# Patient Record
Sex: MALE | Race: WHITE | NOT HISPANIC OR LATINO | Employment: OTHER | URBAN - METROPOLITAN AREA
[De-identification: names, ages, dates, MRNs, and addresses within clinical notes are randomized per-mention and may not be internally consistent; named-entity substitution may affect disease eponyms.]

---

## 2018-01-02 ENCOUNTER — TELEPHONE (OUTPATIENT)
Dept: NEUROLOGY | Facility: CLINIC | Age: 53
End: 2018-01-02

## 2018-01-02 NOTE — TELEPHONE ENCOUNTER
----- Message from Maninder Gaona sent at 1/2/2018 10:48 AM CST -----  Contact: Kristi (Wife) [phone]  Kristi called to speak to someone regarding scheduling the patient's appointment. He needs to be seen for movement. Please contact her to discuss further.

## 2018-04-04 ENCOUNTER — OFFICE VISIT (OUTPATIENT)
Dept: NEUROLOGY | Facility: CLINIC | Age: 53
End: 2018-04-04
Payer: MEDICARE

## 2018-04-04 VITALS
WEIGHT: 201.5 LBS | SYSTOLIC BLOOD PRESSURE: 122 MMHG | BODY MASS INDEX: 30.54 KG/M2 | HEART RATE: 73 BPM | HEIGHT: 68 IN | DIASTOLIC BLOOD PRESSURE: 79 MMHG

## 2018-04-04 DIAGNOSIS — G31.84 MILD COGNITIVE IMPAIRMENT: ICD-10-CM

## 2018-04-04 DIAGNOSIS — G20.C PARKINSONISM, UNSPECIFIED PARKINSONISM TYPE: Primary | ICD-10-CM

## 2018-04-04 DIAGNOSIS — R49.8 HYPOPHONIA: ICD-10-CM

## 2018-04-04 DIAGNOSIS — M62.838 MUSCLE SPASM: ICD-10-CM

## 2018-04-04 PROCEDURE — 99204 OFFICE O/P NEW MOD 45 MIN: CPT | Mod: S$PBB,,, | Performed by: PSYCHIATRY & NEUROLOGY

## 2018-04-04 PROCEDURE — 99213 OFFICE O/P EST LOW 20 MIN: CPT | Mod: PBBFAC | Performed by: PSYCHIATRY & NEUROLOGY

## 2018-04-04 PROCEDURE — 99999 PR PBB SHADOW E&M-EST. PATIENT-LVL III: CPT | Mod: PBBFAC,,, | Performed by: PSYCHIATRY & NEUROLOGY

## 2018-04-04 RX ORDER — ONDANSETRON HYDROCHLORIDE 8 MG/1
8 TABLET, FILM COATED ORAL
COMMUNITY

## 2018-04-04 RX ORDER — DONEPEZIL HYDROCHLORIDE 10 MG/1
10 TABLET, FILM COATED ORAL 2 TIMES DAILY
COMMUNITY
End: 2019-11-18 | Stop reason: SDUPTHER

## 2018-04-04 RX ORDER — METAXALONE 800 MG/1
800 TABLET ORAL 3 TIMES DAILY
COMMUNITY
End: 2018-04-04

## 2018-04-04 RX ORDER — METAXALONE 800 MG/1
800 TABLET ORAL 3 TIMES DAILY PRN
Qty: 90 TABLET | Refills: 4 | Status: SHIPPED | OUTPATIENT
Start: 2018-04-04

## 2018-04-04 RX ORDER — MEMANTINE HYDROCHLORIDE 10 MG/1
10 TABLET ORAL 2 TIMES DAILY
COMMUNITY
End: 2019-11-18 | Stop reason: SDUPTHER

## 2018-04-04 RX ORDER — LOSARTAN POTASSIUM 25 MG/1
25 TABLET ORAL
COMMUNITY
End: 2019-11-18

## 2018-04-04 RX ORDER — AMOXICILLIN 500 MG
CAPSULE ORAL DAILY
COMMUNITY

## 2018-04-04 RX ORDER — CARBIDOPA AND LEVODOPA 25; 100 MG/1; MG/1
1 TABLET ORAL 3 TIMES DAILY
COMMUNITY
End: 2018-05-25 | Stop reason: SDUPTHER

## 2018-04-04 RX ORDER — RASAGILINE 0.5 MG/1
1 TABLET ORAL DAILY
COMMUNITY
End: 2018-04-04

## 2018-04-04 RX ORDER — TIZANIDINE 4 MG/1
4 TABLET ORAL EVERY 6 HOURS PRN
COMMUNITY
End: 2018-04-04

## 2018-04-04 RX ORDER — LORAZEPAM 1 MG/1
1 TABLET ORAL EVERY 6 HOURS PRN
COMMUNITY

## 2018-04-04 NOTE — LETTER
April 6, 2018      Jose Lewis MD  [address] [place] [address] [place] [address]           [facility]  [address]  [place] [address]  Phone: [phone]  Fax: [phone]          Patient: Ash Olson   MR Number: [id number]   YOB: 1965   Date of Visit: 4/4/2018       Dear Dr. Jose Lewis:    Thank you for referring Ash Olson to me for evaluation. Attached you will find relevant portions of my assessment and plan of care.    If you have questions, please do not hesitate to call me. I look forward to following Ash Olson along with you.    Sincerely,    Anastasia Swenson MD    Enclosure  CC:  No Recipients    If you would like to receive this communication electronically, please contact [email or web address] or [phone] to request more information on [employer] Link access.    For providers and/or their staff who would like to refer a patient to [facility], please contact us through our one-stop-shop provider referral line, [facility] , at [phone].    If you feel you have received this communication in error or would no longer like to receive these types of communications, please e-mail [email or web address]

## 2018-04-04 NOTE — PROGRESS NOTES
"Ash Olson I. Chief Complaints during this visit:  New Patient visit for  LBD     Jose Lewis MD  [address] [facility] [place] [address]    Primary Care Physician  Jose Lewsi MD  [address] [facility] [place] [address]      History of present illness:   52 y.o.  male seen in consultation at the request of  Dr. Lewis for evaluation of LBD.  Accompanied by wife.  Gets sore, stiff and slow.  Difficult to get up.  Very soft-spoken and wife finds this difficult.  Also seems to not understand wife at times.  She says his memory is fine, but his "processor" is bad.  Cannot multitask at all.  Very dizzy and light-headed in the mornings.    Diagnosed in 2013 with 7 different things in 9 days by Dr. Melendrez (FTD, PD, etc), so when told LBD, neither were ready to believe.  Wife knew Dr. Miroslava Campos and so got her him in to see her.  Dr. Campos thought it was MCI, not LBD.  Got him seen at [place], where LBD was felt more likely, though he has never had hallucinations.  He did not bring the neuropsych testing with him.    Was working as a , full time at the time of diagnosis.  Lost job 2014.  Was noticing tremor in hands when reaching for coffee cups.  Then started noticing difficulty using computer (not able to open programs to work on them).    He thinks the levodopa helps with his tremors.  Loses balance, falls forward.  He thinks it the glasses.    See or senses someone near him, but does not talk to them.  He had this a year or so in.  Was active dreamer around time of diagnosis, but no longer.    Endorses mild frustrations, but denies sadness.  Denies apathy (fishing, wood working).      II.  Review of systems:  As in HPI,  otherwise, balance 10 systems reviewed and are negative.    III.  Past Medical History:   Diagnosis Date    Mild cognitive impairment 4/4/2018    Parkinsonism 4/4/2018 2013 Resting tremor R>L     No family history " "on file.  Social History     Social History    Marital status:      Spouse name: N/A    Number of children: N/A    Years of education: N/A     Social History Main Topics    Smoking status: Never Smoker    Smokeless tobacco: Never Used    Alcohol use 0.6 oz/week     1 Cans of beer per week    Drug use: Unknown    Sexual activity: Not Asked     Other Topics Concern    None     Social History Narrative    None     Meds:  Skelaxin, zanaflex 4mg (rare, only for back pain)  azilect 0.5mg qd ($300/month)  namenda 10 bid  aricept 10 bid      PRIOR problem-specific medications tried:  Rivastigmine (looked like walking robot).    Review of patient's allergies indicates:  No Known Allergies    IV. Physical Exam    Vitals:    04/04/18 1010   BP: 122/79   Pulse: 73   Weight: 91.4 kg (201 lb 8 oz)   Height: 5' 8" (1.727 m)     General appearance: Well nourished, well developed, no acute distress.         Cardiovascular:  pedal pulses 2, no edema or cyanosis, heart regular rate and rhythym, no carotid bruits.         -------------------------------------------------------------  Facial Expression: 2: Mild: In addition to decreased eye-blink frequency, Masked facies present in the lower  face as well, namely fewer movements around the mouth, such as less  spontaneous smiling, but lips not parted.      Affect: full       Orientation to time & place:  Oriented to time, place, person and situation       Attention & concentration:  Normal attention span and concentration       Memory:  Recent and remote memory intact  Language: Spontaneous, fluent; able to repeat and name objects        Fund of knowledge:  Aware of current events        Speech:  2: Mild: Loss of modulation, diction, or volume, with a few words unclear, but the overall  sentences easy to follow   -------------------------------------------------------  Cranial nerves: normal visual acuity, visual fields full, optic discs not visualized, pupils equal " round and reactive, extraocular movements intact,       facial sensation intact, face symmetrical, hearing intact to whisper, palate raises midline, shoulder shrug strength normal, tongue protrudes midline.        -------------------------------------------------------  Musculoskeletal  Muscle tone: cogwheel bue       Muscle Bulk: all 4 extremities normal        Muscle strength:  5/5 in all 4 extremities        No pronator drift  Sensation: Intact to light touch in all extremities        Deep tendon Reflexes: 2 bilateral biceps, triceps, patella and ankles        --------------------------------------------------------------  Cerebellar and Coordination  Gait:  1: Slight: Independent walking with minor gait impairment.         Finger-nose: no dysmetria       Rapid Alternating Movements (pronation/supination):  Slowed bilaterally, worse on left  --------------------------------------------------------------  MOVEMENT DISORDERS FOCUSED EXAM  Abnormality of movement (bradykinesia, hyperkinesia) present? Yes, 2: Mild: Mild global slowness and poverty of spontaneous movements.    Tremor present?   Yes, intermittent of either hand   Posture:  1: Slight: Not quite erect, but posture could be normal for older person.   Postural stability:  no Rhomberg    V.  Laboratory/ Radiological Data:          3/4/13 MMSE is 29/30 ([facility])    PET 3/28/13:  IMPRESSION:  Based upon visual interpretation and quantitative analysis, abnormal  FDG PET.  Hypometabolism most prominently noted within bilateral  temporoparietal cortex with involvement of the inferoparietal regions  as well as the posterior cingulate.  Also of note, hypometabolic  change within the visual association cortex.  Relative sparring of  metabolism within the frontal regions as well as the anterior temporal  lobe.  Pattern of hypometabolism consistent with underlying  neurodegenerative disorder which may be consistent with underlying  Alzheimer's, however, the  involvement of the visual association cortex  extends the differential diagnosis to include diffuse Lewy body disease.  Review of prior nuclear imaging includes an abnormal brain DaTSCAN with  relatively symmetrically impaired radiotracer uptake within bilateral  striatum.  Abnormal DaTSCAN images in conjunction with the above described  FDG findings are most suggestive of diffuse Lewy body disease. Clinical  correlation required.       VI. Assessment and Plan            Problem List Items Addressed This Visit        1 - High    Parkinsonism - Primary    Overview     2013 Resting tremor R>L         Current Assessment & Plan     Physically classic PD, but with significant changes in cognitive flexibility and speed.  Thus, I see where others may have thought LBD and this still may be most accurate diagnosis, but, as I explained to wife and patient, there is no specific treatment for any of the types of parkinsonism.  Everything is symptom management.  Thus, we need to know, in real life, how each medication effects him (benefits, risks).   -> see instructions for levodopa trial   -> need outside neuropsych testing results for next visit            2     Mild cognitive impairment    Overview     Predominantly speed and multitasking abilities down.            3     Hypophonia       4     Muscle spasm    Current Assessment & Plan     Has taken muscle relaxants prn for back for a long time.   -> refilled skelaxin, d/c tizanidine   -> d/c azilect (risks of serotonin syndrome in combo with muscle relaxants)         Relevant Medications    metaxalone (SKELAXIN) 800 MG tablet                Follow-up in about 3 months (around 7/4/2018).             ___________________________________________________________    I appreciate the opportunity to participate in the care of this patient and will communicate my assessment and plan back to the referring physician via copy of this note.

## 2018-04-04 NOTE — PATIENT INSTRUCTIONS
Test sinemet (carbidopa-levodopa) to see if it works on your tremor as well as your balance, stiffness and movements.    Take one pill about 4 hours apart, 3 times a day.      Example:  Take one at 8am, 12pm and 4pm.  Do this for 3-4 days.  If you get nauseated or the visions of people worsens please stop taking it.    THEN, take 2 pills at 8am, 12pm and 4pm.  Do this for 3-4 days, then you can go back to just one at a time.  It will tell us.    If you feel it HELPS your tremor or your walking, please let me know by [employer] message.  If you cannot tell any difference, then let me know (and stop taking it).

## 2018-04-06 PROBLEM — M62.838 MUSCLE SPASM: Status: ACTIVE | Noted: 2018-04-06

## 2018-04-06 NOTE — ASSESSMENT & PLAN NOTE
ANTICOAGULATION FOLLOW-UP CLINIC VISIT    Patient Name:  Tucker Slater  Date:  2/13/2017  Contact Type:  Face to Face    SUBJECTIVE:     Patient Findings     Positives Inflammation (Pt reports increased right hip pain after carrying a bag of salt to spread on driveway.)           OBJECTIVE    INR Protime   Date Value Ref Range Status   02/13/2017 3.5 (A) 0.86 - 1.14 Final       ASSESSMENT / PLAN  INR assessment SUPRA    Recheck INR In: 3 WEEKS    INR Location Clinic      Anticoagulation Summary as of 2/13/2017     INR goal 2.0-3.0   Today's INR 3.5!   Maintenance plan 2.5 mg (2.5 mg x 1) every day   Full instructions 2/13: 1.25 mg; Otherwise 2.5 mg every day   Weekly total 17.5 mg   Plan last modified Gail Valdovinos RN (4/7/2016)   Next INR check 3/6/2017   Priority INR   Target end date     Indications   Long-term (current) use of anticoagulants [Z79.01] [Z79.01]  Atrial fibrillation (H) [I48.91]         Anticoagulation Episode Summary     INR check location     Preferred lab     Send INR reminders to [facility]    Comments       Anticoagulation Care Providers     Provider Role Specialty Phone number    Kwadwo Winslow MD Responsible Internal Medicine [phone]            See the Encounter Report to view Anticoagulation Flowsheet and Dosing Calendar (Go to Encounters tab in chart review, and find the Anticoagulation Therapy Visit)    Dosage adjustment made based on physician directed care plan.    Gail Valdovinos, RN                Has taken muscle relaxants prn for back for a long time.   -> refilled skelaxin, d/c tizanidine   -> d/c azilect (risks of serotonin syndrome in combo with muscle relaxants)

## 2018-04-06 NOTE — ASSESSMENT & PLAN NOTE
Physically classic PD, but with significant changes in cognitive flexibility and speed.  Thus, I see where others may have thought LBD and this still may be most accurate diagnosis, but, as I explained to wife and patient, there is no specific treatment for any of the types of parkinsonism.  Everything is symptom management.  Thus, we need to know, in real life, how each medication effects him (benefits, risks).   -> see instructions for levodopa trial   -> need outside neuropsych testing results for next visit

## 2018-05-23 ENCOUNTER — TELEPHONE (OUTPATIENT)
Dept: NEUROLOGY | Facility: CLINIC | Age: 53
End: 2018-05-23

## 2018-05-23 NOTE — TELEPHONE ENCOUNTER
----- Message from Donna Wayne sent at 5/23/2018  1:49 PM CDT -----  Contact: Kristi (wife) @ [phone]  Caller has a question in regards to the medication: carbidopa-levodopa  mg (SINEMET)  mg per tabl, asking if the dosage can be changed. Please call.

## 2018-05-23 NOTE — TELEPHONE ENCOUNTER
Called and spoke  regarding her 's medication (Carb-Levo). She stated that he have been taking two tablets every four hours as directed by , but the medication is making him drowsy, not moving very much or drinking very much. She stated that if  can change the way her takes it or take a pill away, plus they are going on vacation starting tomorrow and he wants to know if he can drink red wine and how long after he takes his medication can he have it

## 2018-05-25 RX ORDER — CARBIDOPA AND LEVODOPA 25; 100 MG/1; MG/1
2 TABLET ORAL 3 TIMES DAILY
Qty: 180 TABLET | Refills: 2 | Status: SHIPPED | OUTPATIENT
Start: 2018-05-25 | End: 2019-11-18 | Stop reason: SDUPTHER

## 2018-05-25 NOTE — TELEPHONE ENCOUNTER
Please tell them to read instructions on AVS.  If he is having side-effects, simply back back down to 1 pill at a time or even stop.

## 2018-05-25 NOTE — TELEPHONE ENCOUNTER
----- Message from Donna Wayne sent at 5/25/2018  8:36 AM CDT -----  Rx Refill/Request    Who Called:  Kristi (wife)  Refill or New Rx:  refill  RX Name and Strength: carbidopa-levodopa  mg (SINEMET)  mg per tablet  How is the patient currently taking it? (ex. 1XDay): Per caller 6 tab per day   Is this a 30 day or 90 day RX:  30 day  Preferred Pharmacy with phone number:     [id number] [address] [address]  Phone: [phone] Fax: [phone]    Pt. Contact #:[phone]  Additional Information:says that the patient is on vacation in [place] and is out of his medication, needs sent to a different [facility]. Please see preferred pharmacy.

## 2018-05-29 NOTE — TELEPHONE ENCOUNTER
Called and left a message for  regarding his medications. I stated to give us a call back regarding this matter

## 2018-07-09 ENCOUNTER — OFFICE VISIT (OUTPATIENT)
Dept: NEUROLOGY | Facility: CLINIC | Age: 53
End: 2018-07-09
Payer: MEDICARE

## 2018-07-09 VITALS
BODY MASS INDEX: 29.6 KG/M2 | WEIGHT: 195.31 LBS | HEIGHT: 68 IN | SYSTOLIC BLOOD PRESSURE: 121 MMHG | HEART RATE: 56 BPM | DIASTOLIC BLOOD PRESSURE: 78 MMHG

## 2018-07-09 DIAGNOSIS — G31.84 MILD COGNITIVE IMPAIRMENT: ICD-10-CM

## 2018-07-09 DIAGNOSIS — R09.89 LABILE BLOOD PRESSURE: ICD-10-CM

## 2018-07-09 DIAGNOSIS — G20.C PARKINSONISM, UNSPECIFIED PARKINSONISM TYPE: Primary | ICD-10-CM

## 2018-07-09 PROBLEM — R73.09 LABILE BLOOD GLUCOSE: Status: ACTIVE | Noted: 2018-07-09

## 2018-07-09 PROCEDURE — 99214 OFFICE O/P EST MOD 30 MIN: CPT | Mod: S$GLB,,, | Performed by: PSYCHIATRY & NEUROLOGY

## 2018-07-09 PROCEDURE — 99999 PR PBB SHADOW E&M-EST. PATIENT-LVL III: CPT | Mod: PBBFAC,,, | Performed by: PSYCHIATRY & NEUROLOGY

## 2018-07-09 PROCEDURE — 99213 OFFICE O/P EST LOW 20 MIN: CPT | Mod: PBBFAC | Performed by: PSYCHIATRY & NEUROLOGY

## 2018-07-09 RX ORDER — CARBIDOPA AND LEVODOPA 50; 200 MG/1; MG/1
TABLET, EXTENDED RELEASE ORAL
Qty: 270 TABLET | Refills: 3 | Status: SHIPPED | OUTPATIENT
Start: 2018-07-09 | End: 2019-11-18 | Stop reason: SINTOL

## 2018-07-09 NOTE — PATIENT INSTRUCTIONS
Please get a copy of the neuropsychological testing you've had before.    I want to try to transition from the short-acting to the controlled release form:    Week 1:  Take 25/100 at 7am, 10:30, 2pm and 5:30pm  Take 50/200 at 8:30pm    Week 2:  Take 25/100 at 7am and 5:30pm  Take 50/200 at 10:30pm and 8:30pm    Week 3:  Take 50/200 at 7am, 2pm and 8:30pm

## 2018-07-09 NOTE — LETTER
July 10, 2018      Jose Lewis MD  [address] [place] [address] [place] [address]           [facility]  [address]  [place] [address]  Phone: [phone]  Fax: [phone]          Patient: Ash Olson   MR Number: [id number]   YOB: 1965   Date of Visit: 7/9/2018       Dear Dr. Jose Lewis:    Thank you for referring Ash Olson to me for evaluation. Attached you will find relevant portions of my assessment and plan of care.    If you have questions, please do not hesitate to call me. I look forward to following Ash Olson along with you.    Sincerely,    Anastasia Swenson MD    Enclosure  CC:  No Recipients    If you would like to receive this communication electronically, please contact [email or web address] or [phone] to request more information on [employer] Link access.    For providers and/or their staff who would like to refer a patient to [facility], please contact us through our one-stop-shop provider referral line, [facility], at [phone].    If you feel you have received this communication in error or would no longer like to receive these types of communications, please e-mail [email or web address]

## 2018-07-09 NOTE — PROGRESS NOTES
"Ash Olson I. Chief Complaints during this visit:  f/u Patient visit for  parkinsonism   Jose Lewis MD  [address] [facility] [place] [address]    Primary Care Physician  Jose Lewis MD  [address] [facility] [place] [address]      History of present illness:   53 y.o.  m seen in f/u for parkinsonism.  Accompanied by wife.  The sinemet 2 pills at a time, was helpful initially reduce the tremor, then started causing sleepiness a few weeks in.   Now back to 1 pill every 3 hours and sees the tremors come out when wearing off.  Also gets anxious when it wears off.  A crumb of ativan helps (1/4 of a 1mg pill).    Trouble getting in /out of the bed.    No sensation of people next to him (he says it was his glasses frames).    Interval history 4/4/18:  ...consultation at the request of  Dr. Lewis for evaluation of LBD.  Accompanied by wife.  Gets sore, stiff and slow.  Difficult to get up.  Very soft-spoken and wife finds this difficult.  Also seems to not understand wife at times.  She says his memory is fine, but his "processor" is bad.  Cannot multitask at all.  Very dizzy and light-headed in the mornings.  Diagnosed in 2013 with 7 different things in 9 days by Dr. Melendrez (FTD, PD, etc), so when told LBD, neither were ready to believe.  Wife knew Dr. Miroslava Campos and so got her him in to see her.  Dr. Campos thought it was MCI, not LBD.  Got him seen at [place], where LBD was felt more likely, though he has never had hallucinations [not accurate, see below].  He did not bring the neuropsych testing with him.  Was working as a , full time at the time of diagnosis.  Lost job 2014.  Was noticing tremor in hands when reaching for coffee cups.  Then started noticing difficulty using computer (not able to open programs to work on them).  He thinks the levodopa helps with his tremors.  Loses balance, falls forward.  He thinks it the " "glasses.    See or senses someone near him, but does not talk to them.  He had this a year or so in.  Was active dreamer around time of diagnosis, but no longer.  Endorses mild frustrations, but denies sadness.  Denies apathy (fishing, wood working).      II.  Review of systems:  As in HPI,  otherwise, balance 3 systems reviewed and are negative.    III.  Past Medical History:   Diagnosis Date    Mild cognitive impairment 4/4/2018    Parkinsonism 4/4/2018 2013 Resting tremor R>L     No family history on file.  Social History     Social History    Marital status:      Spouse name: N/A    Number of children: N/A    Years of education: N/A     Social History Main Topics    Smoking status: Never Smoker    Smokeless tobacco: Never Used    Alcohol use 0.6 oz/week     1 Cans of beer per week    Drug use: Unknown    Sexual activity: Not Asked     Other Topics Concern    None     Social History Narrative    None     Meds:  sinemet 25/100 at 7/10:30/2/5:30/8:30/10  Skelaxin (rare, only for back pain)  namenda 10 bid  aricept 10 bid      PRIOR problem-specific medications tried:  Rivastigmine (looked like walking robot); 2 pills of sinemet caused sleepiness.  Azilect was $$    Review of patient's allergies indicates:  No Known Allergies    IV. Physical Exam    Vitals:    07/09/18 1148   BP: 121/78   Pulse: (!) 56   Weight: 88.6 kg (195 lb 5.2 oz)   Height: 5' 8" (1.727 m)     General appearance: Well nourished, well developed, no acute distress.            -------------------------------------------------------------  Facial Expression: 2: Mild: In addition to decreased eye-blink frequency, Masked facies present in the lower  face as well, namely fewer movements around the mouth, such as less  spontaneous smiling, but lips not parted.      Affect: full       Orientation to time & place:  Oriented to time, place, person and situation       Attention & concentration:  Normal attention span and concentration "       Memory:  Recent and remote memory intact  Language: Spontaneous, fluent; able to repeat and name objects        Fund of knowledge:  Aware of current events        Speech:  2: Mild: Loss of modulation, diction, or volume, with a few words unclear, but the overall  sentences easy to follow   -------------------------------------------------------  Cranial nerves: wears glasses, visual fields full, optic discs not visualized, pupils equal round and reactive, extraocular movements intact,       facial sensation intact, face symmetrical, hearing intact to whisper, palate raises midline, shoulder shrug strength normal, tongue protrudes midline.        -------------------------------------------------------  Musculoskeletal  Muscle tone: cogwheel bue       Muscle Bulk: all 4 extremities normal        Muscle strength:  5/5 in all 4 extremities        No pronator drift     --------------------------------------------------------------  Cerebellar and Coordination  Gait:  1: Slight: Independent walking with minor gait impairment.         Finger-nose: no dysmetria       Rapid Alternating Movements (pronation/supination):  Slowed bilaterally, worse on left  --------------------------------------------------------------  MOVEMENT DISORDERS FOCUSED EXAM  Abnormality of movement (bradykinesia, hyperkinesia) present? Yes, 2: Mild: Mild global slowness and poverty of spontaneous movements.    Tremor present?   Yes, intermittent of either hand   Posture:  1: Slight: Not quite erect, but posture could be normal for older person.   Postural stability:  no Shravan MARTINS  Laboratory/ Radiological Data:   No new         From my note 4/4/18:  3/4/13 MMSE is 29/30 ([facility])    PET 3/28/13:  IMPRESSION:  Based upon visual interpretation and quantitative analysis, abnormal  FDG PET.  Hypometabolism most prominently noted within bilateral  temporoparietal cortex with involvement of the inferoparietal regions  as well as the posterior  "cingulate.  Also of note, hypometabolic  change within the visual association cortex.  Relative sparring of  metabolism within the frontal regions as well as the anterior temporal  lobe.  Pattern of hypometabolism consistent with underlying  neurodegenerative disorder which may be consistent with underlying  Alzheimer's, however, the involvement of the visual association cortex  extends the differential diagnosis to include diffuse Lewy body disease.  Review of prior nuclear imaging includes an abnormal brain DaTSCAN with  relatively symmetrically impaired radiotracer uptake within bilateral  striatum.  Abnormal DaTSCAN images in conjunction with the above described  FDG findings are most suggestive of diffuse Lewy body disease. Clinical  correlation required.       VI. Assessment and Plan            Problem List Items Addressed This Visit        1 - High    Parkinsonism - Primary    Overview     2013 Resting tremor R>L         Current Assessment & Plan     Physically classic PD, but with significant changes in cognitive flexibility and speed.  Tremor responds to sinemet and has wearing off anxiety, tremor.  Thus, I see where others may have thought LBD and this still may be most accurate diagnosis, but has not had any hallucinations.  Regardless, everything is symptom management.    -> change to sinemet CR (see instructions)   -> educated, again, about illusions, hallucinations and to report    -> again, need outside neuropsych testing results for next visit         Relevant Medications    carbidopa-levodopa  mg (SINEMET CR)  mg TbSR       2     Mild cognitive impairment    Overview     Predominantly speed and multitasking abilities down.            3     Labile blood pressure    Current Assessment & Plan     Diagnosed with "dysautonomia" by PCP, but I'm not yet sure this is accurate diagnosis.  Certainly, he has not had syncopal or presyncopal event, just elevated BP per outside records (and not " particularly variable; systolic 100 when on losartan and 154 when not).   -> follow                     Follow-up in about 3 months (around 10/9/2018).

## 2018-07-10 PROBLEM — R09.89 LABILE BLOOD PRESSURE: Status: ACTIVE | Noted: 2018-07-09

## 2018-07-10 NOTE — ASSESSMENT & PLAN NOTE
Physically classic PD, but with significant changes in cognitive flexibility and speed.  Tremor responds to sinemet and has wearing off anxiety, tremor.  Thus, I see where others may have thought LBD and this still may be most accurate diagnosis, but has not had any hallucinations.  Regardless, everything is symptom management.    -> change to sinemet CR (see instructions)   -> educated, again, about illusions, hallucinations and to report    -> again, need outside neuropsych testing results for next visit

## 2018-07-10 NOTE — ASSESSMENT & PLAN NOTE
"Diagnosed with "dysautonomia" by PCP, but I'm not yet sure this is accurate diagnosis.  Certainly, he has not had syncopal or presyncopal event, just elevated BP per outside records (and not particularly variable; systolic 100 when on losartan and 154 when not).   -> follow  "

## 2018-08-03 ENCOUNTER — TELEPHONE (OUTPATIENT)
Dept: NEUROLOGY | Facility: CLINIC | Age: 53
End: 2018-08-03

## 2018-08-03 NOTE — TELEPHONE ENCOUNTER
----- Message from Donna Wayne sent at 8/1/2018  2:12 PM CDT -----  Contact: Kristi (wife) @ [phone]  Calling to speak with someone regarding the patient being in week 2 for the medication: carbidopa-levodopa  mg (SINEMET CR)  mg TbSR, says the patient is having a firey feeling in his head, says that the increase in the dosage could possibly be the cause. Please call.

## 2018-08-21 ENCOUNTER — PATIENT MESSAGE (OUTPATIENT)
Dept: NEUROLOGY | Facility: CLINIC | Age: 53
End: 2018-08-21

## 2018-10-10 ENCOUNTER — OFFICE VISIT (OUTPATIENT)
Dept: NEUROLOGY | Facility: CLINIC | Age: 53
End: 2018-10-10
Payer: MEDICARE

## 2018-10-10 VITALS
HEIGHT: 68 IN | HEART RATE: 58 BPM | WEIGHT: 198.19 LBS | SYSTOLIC BLOOD PRESSURE: 103 MMHG | DIASTOLIC BLOOD PRESSURE: 70 MMHG | BODY MASS INDEX: 30.04 KG/M2

## 2018-10-10 DIAGNOSIS — G31.84 MILD COGNITIVE IMPAIRMENT: ICD-10-CM

## 2018-10-10 DIAGNOSIS — G20.C PARKINSONISM, UNSPECIFIED PARKINSONISM TYPE: Primary | ICD-10-CM

## 2018-10-10 PROCEDURE — 99214 OFFICE O/P EST MOD 30 MIN: CPT | Mod: PBBFAC | Performed by: PSYCHIATRY & NEUROLOGY

## 2018-10-10 PROCEDURE — 99214 OFFICE O/P EST MOD 30 MIN: CPT | Mod: S$PBB,,, | Performed by: PSYCHIATRY & NEUROLOGY

## 2018-10-10 PROCEDURE — 99999 PR PBB SHADOW E&M-EST. PATIENT-LVL IV: CPT | Mod: PBBFAC,,, | Performed by: PSYCHIATRY & NEUROLOGY

## 2018-10-10 RX ORDER — SERTRALINE HYDROCHLORIDE 25 MG/1
12.5 TABLET, FILM COATED ORAL
COMMUNITY
Start: 2018-08-07 | End: 2019-08-07

## 2018-10-10 RX ORDER — AMANTADINE HYDROCHLORIDE 100 MG/1
100 TABLET ORAL EVERY MORNING
Qty: 30 TABLET | Refills: 11 | Status: SHIPPED | OUTPATIENT
Start: 2018-10-10 | End: 2019-08-26 | Stop reason: SDUPTHER

## 2018-10-10 NOTE — PROGRESS NOTES
"Ash Olson I. Chief Complaints during this visit:  f/u Patient visit for  parkinsonism   Jose Lewis MD  [address] [place] [address]    Primary Care Physician  Jose Lewis MD  [address] [facility] [place] [address]      History of present illness:   53 y.o.  m seen in f/u for parkinsonism.  Accompanied by wife.  Did not tolerate CR, so back to sinemet 6x/day.  CR worked well at first, then started to worsen.  Going back to 1 pill of sinemet 25/100 every three hours, but works better.    Wife says she thinks his cognition is better when sinemet effective.  Still, the biggest issue is any task that requires multiple steps (such as opening an lillian in phone).      Interval history 7/9/18:  The sinemet 2 pills at a time, was helpful initially reduce the tremor, then started causing sleepiness a few weeks in.   Now back to 1 pill every 3 hours and sees the tremors come out when wearing off.  Also gets anxious when it wears off.  A crumb of ativan helps (1/4 of a 1mg pill).  Trouble getting in /out of the bed.  No sensation of people next to him (he says it was his glasses frames).    Interval history 4/4/18:  ...consultation at the request of  Dr. Lewis for evaluation of LBD.  Accompanied by wife.  Gets sore, stiff and slow.  Difficult to get up.  Very soft-spoken and wife finds this difficult.  Also seems to not understand wife at times.  She says his memory is fine, but his "processor" is bad.  Cannot multitask at all.  Very dizzy and light-headed in the mornings.  Diagnosed in 2013 with 7 different things in 9 days by Dr. Melendrez (FTD, PD, etc), so when told LBD, neither were ready to believe.  Wife knew Dr. Miroslava Campos and so got her him in to see her.  Dr. Campos thought it was MCI, not LBD.  Got him seen at [place], where LBD was felt more likely, though he has never had hallucinations [not accurate, see below].  He did not bring the " neuropsych testing with him.  Was working as a , full time at the time of diagnosis.  Lost job 2014.  Was noticing tremor in hands when reaching for coffee cups.  Then started noticing difficulty using computer (not able to open programs to work on them).  He thinks the levodopa helps with his tremors.  Loses balance, falls forward.  He thinks it the glasses.    See or senses someone near him, but does not talk to them.  He had this a year or so in.  Was active dreamer around time of diagnosis, but no longer.  Endorses mild frustrations, but denies sadness.  Denies apathy (fishing, wood working).      II.  Review of systems:  As in HPI,  otherwise, balance 3 systems reviewed and are negative.    III.  Past Medical History:   Diagnosis Date    Mild cognitive impairment 4/4/2018    Parkinsonism 4/4/2018 2013 Resting tremor R>L     No family history on file.  Social History     Socioeconomic History    Marital status:      Spouse name: None    Number of children: None    Years of education: None    Highest education level: None   Social Needs    Financial resource strain: None    Food insecurity - worry: None    Food insecurity - inability: None    Transportation needs - medical: None    Transportation needs - non-medical: None   Occupational History    None   Tobacco Use    Smoking status: Never Smoker    Smokeless tobacco: Never Used   Substance and Sexual Activity    Alcohol use: Yes     Alcohol/week: 0.6 oz     Types: 1 Cans of beer per week    Drug use: None    Sexual activity: None   Other Topics Concern    None   Social History Narrative    None     Meds:  sinemet 25/100 at 7/10:30/2/5:30/8:30/10  Skelaxin (rare, only for back pain)  namenda 10 bid  aricept 10 bid      PRIOR problem-specific medications tried:  Rivastigmine (looked like walking robot); 2 pills of sinemet caused sleepiness.  Azilect was $$.  Sinemet cr not effective    Review of patient's allergies  "indicates:  No Known Allergies    IV. Physical Exam    Vitals:    10/10/18 1157   BP: 103/70   Pulse: (!) 58   Weight: 89.9 kg (198 lb 3.1 oz)   Height: 5' 8" (1.727 m)     General appearance: Well nourished, well developed, no acute distress.            -------------------------------------------------------------  Facial Expression: 2: Mild: In addition to decreased eye-blink frequency, Masked facies present in the lower  face as well, namely fewer movements around the mouth, such as less  spontaneous smiling, but lips not parted.      Affect: full       Orientation to time & place:  Oriented to time, place, person and situation       Attention & concentration:  Normal attention span and concentration       Memory:  Recent and remote memory intact  Language: Spontaneous, fluent; able to repeat and name objects        Fund of knowledge:  Aware of current events        Speech:  2: Mild: Loss of modulation, diction, or volume, with a few words unclear, but the overall  sentences easy to follow   -------------------------------------------------------  Cranial nerves: wears glasses, visual fields full, optic discs not visualized, pupils equal round and reactive, extraocular movements intact,       facial sensation intact, face symmetrical, hearing intact to whisper, palate raises midline, shoulder shrug strength normal, tongue protrudes midline.        -------------------------------------------------------  Musculoskeletal  Muscle tone: cogwheel bue       Muscle Bulk: all 4 extremities normal        Muscle strength:  5/5 in all 4 extremities        No pronator drift     --------------------------------------------------------------  Cerebellar and Coordination  Gait:  1: Slight: Independent walking with minor gait impairment.         Finger-nose: no dysmetria       Rapid Alternating Movements (pronation/supination):  Slowed bilaterally, worse on " left  --------------------------------------------------------------  MOVEMENT DISORDERS FOCUSED EXAM  Abnormality of movement (bradykinesia, hyperkinesia) present? Yes, 2: Mild: Mild global slowness and poverty of spontaneous movements.    Tremor present?   Yes, intermittent of either hand   Posture:  1: Slight: Not quite erect, but posture could be normal for older person.   Postural stability:  no Rhomberg    V.  Laboratory/ Radiological Data:   No new         From my note 4/4/18:  3/4/13 MMSE is 29/30 ([facility])    PET 3/28/13:  IMPRESSION:  Based upon visual interpretation and quantitative analysis, abnormal  FDG PET.  Hypometabolism most prominently noted within bilateral  temporoparietal cortex with involvement of the inferoparietal regions  as well as the posterior cingulate.  Also of note, hypometabolic  change within the visual association cortex.  Relative sparring of  metabolism within the frontal regions as well as the anterior temporal  lobe.  Pattern of hypometabolism consistent with underlying  neurodegenerative disorder which may be consistent with underlying  Alzheimer's, however, the involvement of the visual association cortex  extends the differential diagnosis to include diffuse Lewy body disease.  Review of prior nuclear imaging includes an abnormal brain DaTSCAN with  relatively symmetrically impaired radiotracer uptake within bilateral  striatum.  Abnormal DaTSCAN images in conjunction with the above described  FDG findings are most suggestive of diffuse Lewy body disease. Clinical  correlation required.       VI. Assessment and Plan            Problem List Items Addressed This Visit        1 - High    Parkinsonism - Primary    Overview     2013 Resting tremor R>L         Current Assessment & Plan     Physically classic PD, but with significant changes in cognitive flexibility and speed and PET suggestive of LBD.  Tremor responds to sinemet and has wearing off anxiety, tremor.  High  frequency (q3hr) medications, unable to tolerate cr.   -> again, need outside neuropsych testing results for next visit   -> start amantadine qam, advised to stop if hallucinations   -> consider comtan   -> briefly discussed DBS (ViM for tremor control as STN too high risk in setting of cognitive complaints) and gave brochure.  I think we may have to be thinking about this in next 12 months.         Relevant Medications    amantadine HCl 100 mg Tab    Other Relevant Orders    Ambulatory Referral to Physical/Occupational Therapy    Ambulatory referral to Speech Therapy       2     Mild cognitive impairment    Overview     Predominantly speed and multitasking abilities down.  PET 2013 suggestive of LBD.         Current Assessment & Plan     Relatively stable, still denies hallucinations.  Does have variability in cognition, possibly with dosing of meds.   -> continue namenda                     Follow-up in about 3 months (around 1/10/2019) for PD.

## 2018-10-10 NOTE — ASSESSMENT & PLAN NOTE
Physically classic PD, but with significant changes in cognitive flexibility and speed and PET suggestive of LBD.  Tremor responds to sinemet and has wearing off anxiety, tremor.  High frequency (q3hr) medications, unable to tolerate cr.   -> again, need outside neuropsych testing results for next visit   -> start amantadine qam, advised to stop if hallucinations   -> consider comtan   -> briefly discussed DBS (ViM for tremor control as STN too high risk in setting of cognitive complaints) and gave brochure.  I think we may have to be thinking about this in next 12 months.

## 2018-10-10 NOTE — ASSESSMENT & PLAN NOTE
Relatively stable, still denies hallucinations.  Does have variability in cognition, possibly with dosing of meds.   -> continue namenda

## 2018-10-10 NOTE — LETTER
October 10, 2018      Jose Lewis MD  [address] [place] [address] [place] [address]           [facility]  [address]  [place] [address]  Phone: [phone]  Fax: [phone]          Patient: Ash Olson   MR Number: [id number]   YOB: 1965   Date of Visit: 10/10/2018       Dear Dr. Jose Lewis:    Thank you for referring Ash Olson to me for evaluation. Attached you will find relevant portions of my assessment and plan of care.    If you have questions, please do not hesitate to call me. I look forward to following Ash Olson along with you.    Sincerely,    Anastasia Swenson MD    Enclosure  CC:  No Recipients    If you would like to receive this communication electronically, please contact [email or web address] or [phone] to request more information on [employer] Link access.    For providers and/or their staff who would like to refer a patient to [facility], please contact us through our one-stop-shop provider referral line, [facility] , at [phone].    If you feel you have received this communication in error or would no longer like to receive these types of communications, please e-mail [email or web address]

## 2019-01-23 ENCOUNTER — TELEPHONE (OUTPATIENT)
Dept: NEUROLOGY | Facility: CLINIC | Age: 54
End: 2019-01-23

## 2019-01-24 ENCOUNTER — TELEPHONE (OUTPATIENT)
Dept: NEUROLOGY | Facility: CLINIC | Age: 54
End: 2019-01-24

## 2019-01-24 NOTE — TELEPHONE ENCOUNTER
----- Message from Maninder Gaona sent at 1/24/2019 11:15 AM CST -----  Contact: Kristi (Wife)  [phone]  Needs Advice    Reason for call: Kristi called to speak to someone regarding rescheduling the patient's appointment      Communication Preference:PHONE     Additional Information: She called a few times this week with no return calls

## 2019-01-24 NOTE — TELEPHONE ENCOUNTER
Called and left a message for  regarding her 's appt with . I stated to give us a call back regarding this matter

## 2019-01-24 NOTE — TELEPHONE ENCOUNTER
----- Message from Justo Faith sent at 1/23/2019  9:14 AM CST -----  Contact: Kristi ( spouse ) @ [phone]  Caller calling to r/s the 1-31st appt, pls return call

## 2019-02-07 ENCOUNTER — OFFICE VISIT (OUTPATIENT)
Dept: NEUROLOGY | Facility: CLINIC | Age: 54
End: 2019-02-07
Payer: MEDICARE

## 2019-02-07 VITALS
SYSTOLIC BLOOD PRESSURE: 113 MMHG | BODY MASS INDEX: 29.2 KG/M2 | DIASTOLIC BLOOD PRESSURE: 77 MMHG | HEIGHT: 68 IN | WEIGHT: 192.69 LBS | HEART RATE: 58 BPM

## 2019-02-07 DIAGNOSIS — G31.84 MILD COGNITIVE IMPAIRMENT: ICD-10-CM

## 2019-02-07 DIAGNOSIS — G20.C PARKINSONISM, UNSPECIFIED PARKINSONISM TYPE: Primary | ICD-10-CM

## 2019-02-07 PROCEDURE — 99999 PR PBB SHADOW E&M-EST. PATIENT-LVL III: ICD-10-PCS | Mod: PBBFAC,,, | Performed by: PSYCHIATRY & NEUROLOGY

## 2019-02-07 PROCEDURE — 99214 PR OFFICE/OUTPT VISIT, EST, LEVL IV, 30-39 MIN: ICD-10-PCS | Mod: S$PBB,,, | Performed by: PSYCHIATRY & NEUROLOGY

## 2019-02-07 PROCEDURE — 99214 OFFICE O/P EST MOD 30 MIN: CPT | Mod: S$PBB,,, | Performed by: PSYCHIATRY & NEUROLOGY

## 2019-02-07 PROCEDURE — 99999 PR PBB SHADOW E&M-EST. PATIENT-LVL III: CPT | Mod: PBBFAC,,, | Performed by: PSYCHIATRY & NEUROLOGY

## 2019-02-07 PROCEDURE — 99213 OFFICE O/P EST LOW 20 MIN: CPT | Mod: PBBFAC | Performed by: PSYCHIATRY & NEUROLOGY

## 2019-02-07 NOTE — ASSESSMENT & PLAN NOTE
Physically classic PD, but with significant changes in cognitive flexibility and speed and PET suggestive of LBD.  Tremor responds to sinemet and has wearing off anxiety, tremor.  High frequency (q3hr) medications, unable to tolerate cr.   -> again, need outside neuropsych testing results for next visit   -> consider comtan   -> briefly discussed DBS last visit (ViM for tremor control as STN too high risk in setting of cognitive complaints) and gave brochure.  I think we may have to be thinking about this in next 12 months.

## 2019-02-07 NOTE — PATIENT INSTRUCTIONS
Try changing the namenda as follows:  Take half pill in the morning and 1.5 in the evening.    This is to see if the namenda is causing fatigue.

## 2019-02-07 NOTE — PROGRESS NOTES
"Ash Olson I. Chief Complaints during this visit:  f/u Patient visit for  parkinsonism   Aaareferral Self  No address on file    Primary Care Physician  Jose Lewis MD  [address] [place] [address] [place] [address]      History of present illness:   53 y.o.  m seen in f/u for parkinsonism.  Accompanied by wife.   He has lost about 10 lbs in the last year which wife is worried about.  Otherwise doing pretty well.  The amantadine was helpful for his gait and tremors.  Wife can see when meds due (wearing).    BIG and Loud was really helpful!    Fatigue is much better.    No falls.    Interval history 10/10/18:  Did not tolerate CR, so back to sinemet 6x/day.  CR worked well at first, then started to worsen.  Going back to 1 pill of sinemet 25/100 every three hours, but works better.  Wife says she thinks his cognition is better when sinemet effective.  Still, the biggest issue is any task that requires multiple steps (such as opening an lillian in phone).    Interval history 7/9/18:  The sinemet 2 pills at a time, was helpful initially reduce the tremor, then started causing sleepiness a few weeks in.   Now back to 1 pill every 3 hours and sees the tremors come out when wearing off.  Also gets anxious when it wears off.  A crumb of ativan helps (1/4 of a 1mg pill).  Trouble getting in /out of the bed.  No sensation of people next to him (he says it was his glasses frames).    Interval history 4/4/18:  ...consultation at the request of  Dr. Lewis for evaluation of LBD.  Accompanied by wife.  Gets sore, stiff and slow.  Difficult to get up.  Very soft-spoken and wife finds this difficult.  Also seems to not understand wife at times.  She says his memory is fine, but his "processor" is bad.  Cannot multitask at all.  Very dizzy and light-headed in the mornings.  Diagnosed in 2013 with 7 different things in 9 days by Dr. Melendrez (FTD, PD, etc), so when told LBD, neither were ready to believe.  Wife " "knew Dr. Miroslava Campos and so got her him in to see her.  Dr. Campos thought it was MCI, not LBD.  Got him seen at [place], where LBD was felt more likely, though he has never had hallucinations [not accurate, see below].  He did not bring the neuropsych testing with him.  Was working as a , full time at the time of diagnosis.  Lost job 2014.  Was noticing tremor in hands when reaching for coffee cups.  Then started noticing difficulty using computer (not able to open programs to work on them).  He thinks the levodopa helps with his tremors.  Loses balance, falls forward.  He thinks it the glasses.    See or senses someone near him, but does not talk to them.  He had this a year or so in.  Was active dreamer around time of diagnosis, but no longer.  Endorses mild frustrations, but denies sadness.  Denies apathy (fishing, wood working).      II.  Review of systems:  As in HPI,  otherwise, balance 3 systems reviewed and are negative.    III.  Past Medical History:   Diagnosis Date    Mild cognitive impairment 4/4/2018    Parkinsonism 4/4/2018 2013 Resting tremor R>L     History reviewed. No pertinent family history.    Social history:  , retired    Meds:  sinemet 25/100 at 7/10:30/2/5:30/8:30/10  Amantadine 100 qd  Skelaxin (rare, only for back pain)  namenda 10 bid  aricept 10 bid      PRIOR problem-specific medications tried:  Rivastigmine (looked like walking robot); 2 pills of sinemet caused sleepiness.  Azilect was $$.  Sinemet cr not effective    Review of patient's allergies indicates:  No Known Allergies    IV. Physical Exam    Vitals:    02/07/19 1128   BP: 113/77   Pulse: (!) 58   Weight: 87.4 kg (192 lb 10.9 oz)   Height: 5' 8" (1.727 m)       Wt Readings from Last 5 Encounters:   02/07/19 87.4 kg (192 lb 10.9 oz)   10/10/18 89.9 kg (198 lb 3.1 oz)   07/09/18 88.6 kg (195 lb 5.2 oz)   04/04/18 91.4 kg (201 lb 8 oz)     General appearance: Well nourished, well developed, no acute " distress.            -------------------------------------------------------------  Facial Expression: 2: Mild: In addition to decreased eye-blink frequency, Masked facies present in the lower  face as well, namely fewer movements around the mouth, such as less  spontaneous smiling, but lips not parted.      Affect: full       Orientation to time & place:  Oriented to time, place, person and situation       Attention & concentration:  Normal attention span and concentration       Memory:  Recent and remote memory intact  Language: Spontaneous, fluent; able to repeat and name objects        Fund of knowledge:  Aware of current events        Speech:  2: Mild: Loss of modulation, diction, or volume, with a few words unclear, but the overall  sentences easy to follow   -------------------------------------------------------  Cranial nerves: wears glasses, visual fields full, optic discs not visualized, pupils equal round and reactive, extraocular movements intact,       facial sensation intact, face symmetrical, hearing intact to whisper, palate raises midline, shoulder shrug strength normal, tongue protrudes midline.        -------------------------------------------------------  Musculoskeletal  Muscle tone: cogwheel bue       Muscle Bulk: all 4 extremities normal        Muscle strength:  5/5 in all 4 extremities        No pronator drift     --------------------------------------------------------------  Cerebellar and Coordination  Gait:  1: Slight: Independent walking with minor gait impairment.         Finger-nose: no dysmetria       Rapid Alternating Movements (pronation/supination):  Slowed bilaterally, worse on left  --------------------------------------------------------------  MOVEMENT DISORDERS FOCUSED EXAM  Abnormality of movement (bradykinesia, hyperkinesia) present? Yes, 2: Mild: Mild global slowness and poverty of spontaneous movements.    Tremor present?   Yes, intermittent of either hand   Posture:  1:  Slight: Not quite erect, but posture could be normal for older person.   Postural stability:  no Rhomberg    V.  Laboratory/ Radiological Data:   No new         From my note 4/4/18:  3/4/13 MMSE is 29/30 ([facility])    PET 3/28/13:  IMPRESSION:  Based upon visual interpretation and quantitative analysis, abnormal  FDG PET.  Hypometabolism most prominently noted within bilateral  temporoparietal cortex with involvement of the inferoparietal regions  as well as the posterior cingulate.  Also of note, hypometabolic  change within the visual association cortex.  Relative sparring of  metabolism within the frontal regions as well as the anterior temporal  lobe.  Pattern of hypometabolism consistent with underlying  neurodegenerative disorder which may be consistent with underlying  Alzheimer's, however, the involvement of the visual association cortex  extends the differential diagnosis to include diffuse Lewy body disease.  Review of prior nuclear imaging includes an abnormal brain DaTSCAN with  relatively symmetrically impaired radiotracer uptake within bilateral  striatum.  Abnormal DaTSCAN images in conjunction with the above described  FDG findings are most suggestive of diffuse Lewy body disease. Clinical  correlation required.       VI. Assessment and Plan            Problem List Items Addressed This Visit        1 - High    Parkinsonism - Primary    Overview     2013 Resting tremor R>L         Current Assessment & Plan     Physically classic PD, but with significant changes in cognitive flexibility and speed and PET suggestive of LBD.  Tremor responds to sinemet and has wearing off anxiety, tremor.  High frequency (q3hr) medications, unable to tolerate cr.   -> again, need outside neuropsych testing results for next visit   -> consider comtan   -> briefly discussed DBS last visit (ViM for tremor control as STN too high risk in setting of cognitive complaints) and gave brochure.  I think we may have to be thinking  about this in next 12 months.            2     Mild cognitive impairment    Overview     Predominantly speed and multitasking abilities down.  PET 2013 suggestive of LBD.                     Follow-up in about 4 months (around 6/7/2019) for PD.

## 2019-06-18 ENCOUNTER — TELEPHONE (OUTPATIENT)
Dept: NEUROLOGY | Facility: CLINIC | Age: 54
End: 2019-06-18

## 2019-06-18 NOTE — TELEPHONE ENCOUNTER
----- Message from Donna Wayne sent at 6/18/2019  8:42 AM CDT -----  Contact: Kristi (wife) @ [phone]  Calling to speak with someone in Dr. Swenson's office regarding patient's appt on tomorrow. Please call.

## 2019-08-26 DIAGNOSIS — G20.C PARKINSONISM, UNSPECIFIED PARKINSONISM TYPE: ICD-10-CM

## 2019-08-26 RX ORDER — AMANTADINE HYDROCHLORIDE 100 MG/1
100 TABLET ORAL EVERY MORNING
Qty: 30 TABLET | Refills: 11 | Status: SHIPPED | OUTPATIENT
Start: 2019-08-26 | End: 2019-11-18 | Stop reason: SDUPTHER

## 2019-08-26 NOTE — TELEPHONE ENCOUNTER
----- Message from John Carter sent at 8/26/2019  9:58 AM CDT -----  Pharmacy Calling    Reason for call: Refill    Pharmacy Name: [facility]    Prescription Name: amantadine HCl 100 mg Tab    Phone Number:     Additional Information:

## 2019-10-15 ENCOUNTER — TELEPHONE (OUTPATIENT)
Dept: NEUROLOGY | Facility: CLINIC | Age: 54
End: 2019-10-15

## 2019-10-15 NOTE — TELEPHONE ENCOUNTER
----- Message from Kira Segundo sent at 10/15/2019  9:42 AM CDT -----  Contact: pts wife at [phone]  Leela pt-pt needs appt for a fu appt/movement disorder.  Pt cancelled his last appt and someone was supposed to call him back to reschedule it.  Ready to schedule.

## 2019-11-18 ENCOUNTER — OFFICE VISIT (OUTPATIENT)
Dept: NEUROLOGY | Facility: CLINIC | Age: 54
End: 2019-11-18
Payer: MEDICARE

## 2019-11-18 VITALS
HEART RATE: 58 BPM | WEIGHT: 203.06 LBS | BODY MASS INDEX: 30.78 KG/M2 | HEIGHT: 68 IN | SYSTOLIC BLOOD PRESSURE: 123 MMHG | DIASTOLIC BLOOD PRESSURE: 81 MMHG

## 2019-11-18 DIAGNOSIS — F02.80 LEWY BODY DEMENTIA WITHOUT BEHAVIORAL DISTURBANCE: ICD-10-CM

## 2019-11-18 DIAGNOSIS — G20.C PARKINSONISM, UNSPECIFIED PARKINSONISM TYPE: Primary | ICD-10-CM

## 2019-11-18 DIAGNOSIS — M54.32 SCIATICA OF LEFT SIDE: ICD-10-CM

## 2019-11-18 DIAGNOSIS — G31.83 LEWY BODY DEMENTIA WITHOUT BEHAVIORAL DISTURBANCE: ICD-10-CM

## 2019-11-18 DIAGNOSIS — R49.8 HYPOPHONIA: ICD-10-CM

## 2019-11-18 DIAGNOSIS — G31.84 MILD COGNITIVE IMPAIRMENT: ICD-10-CM

## 2019-11-18 PROCEDURE — 99999 PR PBB SHADOW E&M-EST. PATIENT-LVL IV: CPT | Mod: PBBFAC,,, | Performed by: PSYCHIATRY & NEUROLOGY

## 2019-11-18 PROCEDURE — 99214 OFFICE O/P EST MOD 30 MIN: CPT | Mod: PBBFAC | Performed by: PSYCHIATRY & NEUROLOGY

## 2019-11-18 PROCEDURE — 99999 PR PBB SHADOW E&M-EST. PATIENT-LVL IV: ICD-10-PCS | Mod: PBBFAC,,, | Performed by: PSYCHIATRY & NEUROLOGY

## 2019-11-18 PROCEDURE — 99214 OFFICE O/P EST MOD 30 MIN: CPT | Mod: S$PBB,,, | Performed by: PSYCHIATRY & NEUROLOGY

## 2019-11-18 PROCEDURE — 99214 PR OFFICE/OUTPT VISIT, EST, LEVL IV, 30-39 MIN: ICD-10-PCS | Mod: S$PBB,,, | Performed by: PSYCHIATRY & NEUROLOGY

## 2019-11-18 RX ORDER — DONEPEZIL HYDROCHLORIDE 10 MG/1
10 TABLET, FILM COATED ORAL 2 TIMES DAILY
Qty: 180 TABLET | Refills: 3 | Status: SHIPPED | OUTPATIENT
Start: 2019-11-18

## 2019-11-18 RX ORDER — ASPIRIN 325 MG
200 TABLET, DELAYED RELEASE (ENTERIC COATED) ORAL DAILY
COMMUNITY

## 2019-11-18 RX ORDER — AMANTADINE HYDROCHLORIDE 100 MG/1
100 TABLET ORAL EVERY MORNING
Qty: 30 TABLET | Refills: 11 | Status: SHIPPED | OUTPATIENT
Start: 2019-11-18 | End: 2020-11-17

## 2019-11-18 RX ORDER — CARBIDOPA AND LEVODOPA 25; 100 MG/1; MG/1
1 TABLET ORAL
Qty: 540 TABLET | Refills: 4 | Status: SHIPPED | OUTPATIENT
Start: 2019-11-18

## 2019-11-18 RX ORDER — TIZANIDINE 2 MG/1
2 TABLET ORAL NIGHTLY
COMMUNITY

## 2019-11-18 RX ORDER — MEMANTINE HYDROCHLORIDE 10 MG/1
10 TABLET ORAL 2 TIMES DAILY
Qty: 180 TABLET | Refills: 4 | Status: SHIPPED | OUTPATIENT
Start: 2019-11-18

## 2019-11-18 NOTE — PROGRESS NOTES
Ash MUNOZ Chief Complaints during this visit:  f/u Patient visit for  parkinsonism   No referring provider defined for this encounter.    Primary Care Physician  Jose Lewis MD  [address] [place] [address]  [place] [id number]      History of present illness:   54 y.o.  m seen in f/u for parkinsonism.  Accompanied by wife.  Wife diagnosed with colon cancer, getting 5FU and missed an appointment.  Tremor is doing better.  Home neurologist changed him back to carbidopa-levodopa IR as he did not tolerate the 50/200 cr.    Difficulty getting out of bed, sitting up or even roll over.     More short-term memory loss.    Will need a wisdom tooth pulled.    2/7/19  He has lost about 10 lbs in the last year which wife is worried about.  Otherwise doing pretty well.  The amantadine was helpful for his gait and tremors.  Wife can see when meds due (wearing).  BIG and Loud was really helpful!  Fatigue is much better.  No falls.    Interval history 10/10/18:  Did not tolerate CR, so back to sinemet 6x/day.  CR worked well at first, then started to worsen.  Going back to 1 pill of sinemet 25/100 every three hours, but works better.  Wife says she thinks his cognition is better when sinemet effective.  Still, the biggest issue is any task that requires multiple steps (such as opening an lillian in phone).    Interval history 7/9/18:  The sinemet 2 pills at a time, was helpful initially reduce the tremor, then started causing sleepiness a few weeks in.   Now back to 1 pill every 3 hours and sees the tremors come out when wearing off.  Also gets anxious when it wears off.  A crumb of ativan helps (1/4 of a 1mg pill).  Trouble getting in /out of the bed.  No sensation of people next to him (he says it was his glasses frames).    Interval history 4/4/18:  ...consultation at the request of  Dr. Lewis for evaluation of LBD.  Accompanied by wife.  Gets sore, stiff and slow.  Difficult to get up.  Very soft-spoken  "and wife finds this difficult.  Also seems to not understand wife at times.  She says his memory is fine, but his "processor" is bad.  Cannot multitask at all.  Very dizzy and light-headed in the mornings.  Diagnosed in 2013 with 7 different things in 9 days by Dr. Melendrez (FTD, PD, etc), so when told LBD, neither were ready to believe.  Wife knew Dr. Miroslava Cmapos and so got her him in to see her.  Dr. Campos thought it was MCI, not LBD.  Got him seen at [place], where LBD was felt more likely, though he has never had hallucinations [not accurate, see below].  He did not bring the neuropsych testing with him.  Was working as a , full time at the time of diagnosis.  Lost job 2014.  Was noticing tremor in hands when reaching for coffee cups.  Then started noticing difficulty using computer (not able to open programs to work on them).  He thinks the levodopa helps with his tremors.  Loses balance, falls forward.  He thinks it the glasses.    See or senses someone near him, but does not talk to them.  He had this a year or so in.  Was active dreamer around time of diagnosis, but no longer.  Endorses mild frustrations, but denies sadness.  Denies apathy (fishing, wood working).      II.  Review of systems:  As in HPI,  otherwise, balance 3 systems reviewed and are negative.    III.  Past Medical History:   Diagnosis Date    Mild cognitive impairment 4/4/2018    Parkinsonism 4/4/2018 2013 Resting tremor R>L     History reviewed. No pertinent family history.    Social history:  , retired    Meds:  sinemet 25/100 at 7/10:30/2/5:30/8:30/10  Amantadine 100 qd  Skelaxin (rare, only for back pain)  namenda 10 bid  aricept 10 bid      PRIOR problem-specific medications tried:  Rivastigmine (looked like walking robot); 2 pills of sinemet caused sleepiness.  Azilect was $$.  Sinemet cr not effective    Review of patient's allergies indicates:  No Known Allergies    IV. Physical Exam    Vitals:    " "11/18/19 1132   BP: 123/81   Pulse: (!) 58   Weight: 92.1 kg (203 lb 0.7 oz)   Height: 5' 8" (1.727 m)       Wt Readings from Last 5 Encounters:   11/18/19 92.1 kg (203 lb 0.7 oz)   02/07/19 87.4 kg (192 lb 10.9 oz)   10/10/18 89.9 kg (198 lb 3.1 oz)   07/09/18 88.6 kg (195 lb 5.2 oz)   04/04/18 91.4 kg (201 lb 8 oz)     General appearance: Well nourished, well developed, no acute distress.            -------------------------------------------------------------  Facial Expression: 2: Mild: In addition to decreased eye-blink frequency, Masked facies present in the lower  face as well, namely fewer movements around the mouth, such as less  spontaneous smiling, but lips not parted.      Affect: full       Orientation to time & place:  Oriented to time, place, person and situation       Attention & concentration:  Normal attention span and concentration       Memory:  Recent and remote memory intact  Language: Spontaneous, fluent; able to repeat and name objects        Fund of knowledge:  Aware of current events        Speech:  2: Mild: Loss of modulation, diction, or volume, with a few words unclear, but the overall  sentences easy to follow   -------------------------------------------------------  Cranial nerves: wears glasses, visual fields full, optic discs not visualized, pupils equal round and reactive, extraocular movements intact,       facial sensation intact, face symmetrical, hearing intact to whisper, palate raises midline, shoulder shrug strength normal, tongue protrudes midline.        -------------------------------------------------------  Musculoskeletal  Muscle tone: vanessa to       Muscle Bulk: all 4 extremities normal        Muscle strength:  5/5 in all 4 extremities        No pronator drift     --------------------------------------------------------------  Cerebellar and Coordination  Gait:  1: Slight: Independent walking with minor gait impairment.         Finger-nose: no dysmetria     "   Rapid Alternating Movements (pronation/supination):  Slowed bilaterally, worse on left  --------------------------------------------------------------  MOVEMENT DISORDERS FOCUSED EXAM  Abnormality of movement (bradykinesia, hyperkinesia) present? Yes, 2: Mild: Mild global slowness and poverty of spontaneous movements.    Tremor present?   Yes, intermittent of either hand   Posture:  1: Slight: Not quite erect, but posture could be normal for older person.   Postural stability:  no Rhomberg    V.  Laboratory/ Radiological Data:   No new         From my note 4/4/18:  3/4/13 MMSE is 29/30 ([facility])    PET 3/28/13:  IMPRESSION:  Based upon visual interpretation and quantitative analysis, abnormal  FDG PET.  Hypometabolism most prominently noted within bilateral  temporoparietal cortex with involvement of the inferoparietal regions  as well as the posterior cingulate.  Also of note, hypometabolic  change within the visual association cortex.  Relative sparring of  metabolism within the frontal regions as well as the anterior temporal  lobe.  Pattern of hypometabolism consistent with underlying  neurodegenerative disorder which may be consistent with underlying  Alzheimer's, however, the involvement of the visual association cortex  extends the differential diagnosis to include diffuse Lewy body disease.  Review of prior nuclear imaging includes an abnormal brain DaTSCAN with  relatively symmetrically impaired radiotracer uptake within bilateral  striatum.  Abnormal DaTSCAN images in conjunction with the above described  FDG findings are most suggestive of diffuse Lewy body disease. Clinical  correlation required.     Neuropsych 2013:          VI. Assessment and Plan            Problem List Items Addressed This Visit        1 - High    Parkinsonism - Primary    Overview     2013 Resting tremor R>L         Current Assessment & Plan     Physically classic PD, but with significant changes in cognitive flexibility and  speed and PET consistent with LBD.  Tremor responds to sinemet and has wearing off anxiety, tremor.  High frequency (q3hr) medications, unable to tolerate cr, but currently stable (has not changed in 8 months).   -> no changes   -> briefly discussed DBS (ViM for tremor control as STN too high risk in setting of cognitive complaints), but with tremor under better control, I currently feel risks outweigh benefit.         Relevant Medications    amantadine HCl 100 mg Tab    carbidopa-levodopa  mg (SINEMET)  mg per tablet    Other Relevant Orders    Ambulatory Referral to Physical/Occupational Therapy    Ambulatory referral to Speech Therapy       2     Lewy body dementia without behavioral disturbance    Overview     Predominantly speed and multitasking abilities down.  PET, neuropsych 2013.         Current Assessment & Plan     Subjective worsening.  Declines neuropsych repeat testing.   -> continue aricept, namenda   -> given info for [facility] clinical trial.         Relevant Medications    donepezil (ARICEPT) 10 MG tablet    memantine (NAMENDA) 10 MG Tab       3     Hypophonia    Relevant Orders    Ambulatory referral to Speech Therapy      Other Visit Diagnoses     Sciatica of left side        Relevant Orders    Ambulatory Referral to Physical/Occupational Therapy                Follow up in about 4 months (around 3/18/2020).

## 2019-11-18 NOTE — ASSESSMENT & PLAN NOTE
Subjective worsening.  Declines neuropsych repeat testing.   -> continue aricept, namenda   -> given info for [facility] clinical trial.

## 2019-11-18 NOTE — PATIENT INSTRUCTIONS
A Study of [id number] in Participants With Dementia Due to Lewy Body Dementia (LBD) Associated With Idiopathic Parkinson's Disease (PD) or Dementia With Lewy Bodies (DLB) ([facility])    Please call to see if they are accepting new patients.  If they are, then let me know what they need for referral.    Investigators           : Call [phone]) or [phone] Mon - Fri 9 AM - 5 PM Eastern time ([facility]/T - 5 hours, EST)

## 2019-11-18 NOTE — ASSESSMENT & PLAN NOTE
Physically classic PD, but with significant changes in cognitive flexibility and speed and PET consistent with LBD.  Tremor responds to sinemet and has wearing off anxiety, tremor.  High frequency (q3hr) medications, unable to tolerate cr, but currently stable (has not changed in 8 months).   -> no changes   -> briefly discussed DBS (ViM for tremor control as STN too high risk in setting of cognitive complaints), but with tremor under better control, I currently feel risks outweigh benefit.

## 2020-03-09 ENCOUNTER — TELEPHONE (OUTPATIENT)
Dept: NEUROLOGY | Facility: CLINIC | Age: 55
End: 2020-03-09

## 2020-03-09 NOTE — TELEPHONE ENCOUNTER
----- Message from Piter Chaudhry MA sent at 3/5/2020  4:15 PM CST -----  Contact: Kristi (wife) @ [phone]      ----- Message -----  From: Donna Wayne  Sent: 3/5/2020   1:34 PM CST  To: Leela JONES Staff    Calling to reschedule patient's appt with Dr. Swenson. Please call.

## 2020-03-09 NOTE — TELEPHONE ENCOUNTER
Called and spoke to  regarding rescheduling her 's appt. She stated that she would like push his lillian back until May due to her undergoing surgery. May 25 at 11:40AM

## 2020-05-22 ENCOUNTER — TELEPHONE (OUTPATIENT)
Dept: NEUROLOGY | Facility: CLINIC | Age: 55
End: 2020-05-22

## 2020-05-22 NOTE — TELEPHONE ENCOUNTER
Called and spoke to  regarding her 's appt with  on this upcoming Monday.     Switched to Virtual Visit

## 2020-05-22 NOTE — TELEPHONE ENCOUNTER
----- Message from Maninder Gaona sent at 5/22/2020  9:23 AM CDT -----  Contact: Pt. [phone]  The patient would like to speak to someone regarding changing his appointment to a virtual visit. Please contact the patient to discuss further.

## 2020-05-25 ENCOUNTER — OFFICE VISIT (OUTPATIENT)
Dept: NEUROLOGY | Facility: CLINIC | Age: 55
End: 2020-05-25
Payer: MEDICARE

## 2020-05-25 ENCOUNTER — TELEPHONE (OUTPATIENT)
Dept: NEUROLOGY | Facility: CLINIC | Age: 55
End: 2020-05-25

## 2020-05-25 DIAGNOSIS — G47.33 OSA ON CPAP: ICD-10-CM

## 2020-05-25 DIAGNOSIS — G31.83 LEWY BODY DEMENTIA WITHOUT BEHAVIORAL DISTURBANCE: ICD-10-CM

## 2020-05-25 DIAGNOSIS — F02.80 LEWY BODY DEMENTIA WITHOUT BEHAVIORAL DISTURBANCE: ICD-10-CM

## 2020-05-25 DIAGNOSIS — G20.C PARKINSONISM, UNSPECIFIED PARKINSONISM TYPE: Primary | ICD-10-CM

## 2020-05-25 DIAGNOSIS — G47.01 INSOMNIA DUE TO MEDICAL CONDITION: ICD-10-CM

## 2020-05-25 PROCEDURE — 99214 PR OFFICE/OUTPT VISIT, EST, LEVL IV, 30-39 MIN: ICD-10-PCS | Mod: 95,,, | Performed by: PSYCHIATRY & NEUROLOGY

## 2020-05-25 PROCEDURE — 99214 OFFICE O/P EST MOD 30 MIN: CPT | Mod: 95,,, | Performed by: PSYCHIATRY & NEUROLOGY

## 2020-05-25 RX ORDER — QUETIAPINE FUMARATE 25 MG/1
25 TABLET, FILM COATED ORAL NIGHTLY
COMMUNITY
Start: 2020-05-08 | End: 2020-05-25

## 2020-05-25 RX ORDER — QUETIAPINE FUMARATE 50 MG/1
50 TABLET, FILM COATED ORAL NIGHTLY
Qty: 90 TABLET | Refills: 4 | Status: SHIPPED | OUTPATIENT
Start: 2020-05-25

## 2020-05-25 NOTE — ASSESSMENT & PLAN NOTE
Tremors controlled.  High frequency (q3hr) medications, unable to tolerate cr, but currently stable (has not changed in 8 months).  He is having more difficulty getting out of bed, rolling over.   -> advised exercise bike and will send list of online video resources   -> not discussed this visit, but last visit we briefly discussed DBS (ViM for tremor control as STN too high risk in setting of cognitive complaints), but with tremor under better control, I currently feel risks outweigh benefit.

## 2020-05-25 NOTE — ASSESSMENT & PLAN NOTE
Stable.   -> advised to move aricept to just morning (20mg) to see if this is interfering in sleep.

## 2020-05-25 NOTE — PATIENT INSTRUCTIONS
Copy and paste the following web address for a list of online exercise resources you can do at home:    [email or web address]/?utm_source=[email or web address]Newsletter&utm_campaign=[id number]&utm_medium=email&utm_term=[id number]&utm_source=[employer]+Newsletter&utm_campaign=[id number]&utm_medium=email&utm_term=[id number]

## 2020-05-25 NOTE — PROGRESS NOTES
Ash MUNOZ Chief Complaints during this visit:  f/u Patient visit for  parkinsonism   No referring provider defined for this encounter.    Primary Care Physician  Jose Lewis MD  [address] [place] [address] [place]  [place] [address]      History of present illness:   54 y.o.  m seen in f/u for parkinsonism.  Accompanied by wife.    VIDEO VISIT  Tremors controlled, he has been very good with timing.  Trouble staying asleep.  Often has to use the bathroom.  Uses cpap.  seroquel not working as well as in past    11/18/19  Wife diagnosed with colon cancer, getting 5FU and missed an appointment.  Tremor is doing better.  Home neurologist changed him back to carbidopa-levodopa IR as he did not tolerate the 50/200 cr.  Difficulty getting out of bed, sitting up or even roll over.     More short-term memory loss.  Will need a wisdom tooth pulled.    2/7/19  He has lost about 10 lbs in the last year which wife is worried about.  Otherwise doing pretty well.  The amantadine was helpful for his gait and tremors.  Wife can see when meds due (wearing).  BIG and Loud was really helpful!  Fatigue is much better.  No falls.    Interval history 10/10/18:  Did not tolerate CR, so back to sinemet 6x/day.  CR worked well at first, then started to worsen.  Going back to 1 pill of sinemet 25/100 every three hours, but works better.  Wife says she thinks his cognition is better when sinemet effective.  Still, the biggest issue is any task that requires multiple steps (such as opening an lillian in phone).    Interval history 7/9/18:  The sinemet 2 pills at a time, was helpful initially reduce the tremor, then started causing sleepiness a few weeks in.   Now back to 1 pill every 3 hours and sees the tremors come out when wearing off.  Also gets anxious when it wears off.  A crumb of ativan helps (1/4 of a 1mg pill).  Trouble getting in /out of the bed.  No sensation of people next to him (he says it was his glasses  "frames).    Interval history 4/4/18:  ...consultation at the request of  Dr. Lewis for evaluation of LBD.  Accompanied by wife.  Gets sore, stiff and slow.  Difficult to get up.  Very soft-spoken and wife finds this difficult.  Also seems to not understand wife at times.  She says his memory is fine, but his "processor" is bad.  Cannot multitask at all.  Very dizzy and light-headed in the mornings.  Diagnosed in 2013 with 7 different things in 9 days by Dr. Melendrez (FTD, PD, etc), so when told LBD, neither were ready to believe.  Wife knew Dr. Miroslava Campos and so got her him in to see her.  Dr. Campos thought it was MCI, not LBD.  Got him seen at [place], where LBD was felt more likely, though he has never had hallucinations [not accurate, see below].  He did not bring the neuropsych testing with him.  Was working as a , full time at the time of diagnosis.  Lost job 2014.  Was noticing tremor in hands when reaching for coffee cups.  Then started noticing difficulty using computer (not able to open programs to work on them).  He thinks the levodopa helps with his tremors.  Loses balance, falls forward.  He thinks it the glasses.    See or senses someone near him, but does not talk to them.  He had this a year or so in.  Was active dreamer around time of diagnosis, but no longer.  Endorses mild frustrations, but denies sadness.  Denies apathy (fishing, wood working).      II.  Review of systems:  As in HPI,  otherwise, balance 3 systems reviewed and are negative.    III.  Past Medical History:   Diagnosis Date    Mild cognitive impairment 4/4/2018    Parkinsonism 4/4/2018 2013 Resting tremor R>L     History reviewed. No pertinent family history.    Social history:  , retired    Meds:  sinemet 25/100 at 7/10:30/2/5:30/8:30/10  Amantadine 100 qd  Skelaxin (rare, only for back pain)  namenda 10 bid  aricept 10 bid      PRIOR problem-specific medications tried:  Rivastigmine (looked like " walking robot); 2 pills of sinemet caused sleepiness.  Azilect was $$.  Sinemet cr not effective    Review of patient's allergies indicates:  No Known Allergies    IV. Physical Exam    There were no vitals filed for this visit.    Wt Readings from Last 5 Encounters:   11/18/19 92.1 kg (203 lb 0.7 oz)   02/07/19 87.4 kg (192 lb 10.9 oz)   10/10/18 89.9 kg (198 lb 3.1 oz)   07/09/18 88.6 kg (195 lb 5.2 oz)   04/04/18 91.4 kg (201 lb 8 oz)     General appearance: Well nourished, well developed, no acute distress.            -------------------------------------------------------------  Facial Expression: 2: Mild: In addition to decreased eye-blink frequency, Masked facies present in the lower  face as well, namely fewer movements around the mouth, such as less  spontaneous smiling, but lips not parted.      Affect: full       Orientation to time & place:  Oriented to time, place, person and situation       Attention & concentration:  Normal attention span and concentration       Memory:  Recent and remote memory intact  Language: Spontaneous, fluent; able to repeat and name objects        Fund of knowledge:  Aware of current events        Speech:  2: Mild: Loss of modulation, diction, or volume, with a few words unclear, but the overall  sentences easy to follow   -    MOVEMENT DISORDERS FOCUSED EXAM  Abnormality of movement (bradykinesia, hyperkinesia) present? Yes, 2: Mild: Mild global slowness and poverty of spontaneous movements.    Tremor present?  None today      V.  Laboratory/ Radiological Data:   No new         From my note 4/4/18:  3[facility]    PET 3/28/13:  IMPRESSION:  Based upon visual interpretation and quantitative analysis, abnormal  FDG PET.  Hypometabolism most prominently noted within bilateral  temporoparietal cortex with involvement of the inferoparietal regions  as well as the posterior cingulate.  Also of note, hypometabolic  change within the visual association  cortex.  Relative sparring of  metabolism within the frontal regions as well as the anterior temporal  lobe.  Pattern of hypometabolism consistent with underlying  neurodegenerative disorder which may be consistent with underlying  Alzheimer's, however, the involvement of the visual association cortex  extends the differential diagnosis to include diffuse Lewy body disease.  Review of prior nuclear imaging includes an abnormal brain DaTSCAN with  relatively symmetrically impaired radiotracer uptake within bilateral  striatum.  Abnormal DaTSCAN images in conjunction with the above described  FDG findings are most suggestive of diffuse Lewy body disease. Clinical  correlation required.     Neuropsych 2013:          VI. Assessment and Plan            Problem List Items Addressed This Visit        1 - High    Parkinsonism - Primary    Overview     2013 Resting tremor R>L  Physically classic PD, but with significant changes in cognitive flexibility and speed and PET consistent with LBD         Current Assessment & Plan     Tremors controlled.  High frequency (q3hr) medications, unable to tolerate cr, but currently stable (has not changed in 8 months).  He is having more difficulty getting out of bed, rolling over.   -> advised exercise bike and will send list of online video resources   -> not discussed this visit, but last visit we briefly discussed DBS (ViM for tremor control as STN too high risk in setting of cognitive complaints), but with tremor under better control, I currently feel risks outweigh benefit.            2     Lewy body dementia without behavioral disturbance    Overview     Predominantly speed and multitasking abilities down.  PET, neuropsych 2013.         Current Assessment & Plan     Stable.   -> advised to move aricept to just morning (20mg) to see if this is interfering in sleep.            3     Insomnia due to medical condition    Overview     Sleep maintenance insomnia         Current  Assessment & Plan     Likely due to nocturia vs Parkinson's Disease.   -> increase seroquel to 50 qhs         BOAZ on CPAP                No follow-ups on file.       The patient location is: HOME  The chief complaint leading to visit is:   1. Parkinsonism, unspecified Parkinsonism type     2. Lewy body dementia without behavioral disturbance     3. Insomnia due to medical condition     4. BOAZ on CPAP       Visit type: Virtual visit with synchronous audio and video  Total time spent with patient: 15 minute  Each patient to whom he or she provides medical services by telemedicine is:  (1) informed of the relationship between the physician and patient and the respective role of any other health care provider with respect to management of the patient; and (2) notified that he or she may decline to receive medical services by telemedicine and may withdraw from such care at any time.